# Patient Record
Sex: FEMALE | Race: WHITE | NOT HISPANIC OR LATINO | Employment: OTHER | URBAN - METROPOLITAN AREA
[De-identification: names, ages, dates, MRNs, and addresses within clinical notes are randomized per-mention and may not be internally consistent; named-entity substitution may affect disease eponyms.]

---

## 2022-09-22 ENCOUNTER — HOSPITAL ENCOUNTER (EMERGENCY)
Facility: MEDICAL CENTER | Age: 72
End: 2022-09-22
Attending: EMERGENCY MEDICINE
Payer: COMMERCIAL

## 2022-09-22 ENCOUNTER — APPOINTMENT (OUTPATIENT)
Dept: RADIOLOGY | Facility: MEDICAL CENTER | Age: 72
End: 2022-09-22
Attending: EMERGENCY MEDICINE
Payer: COMMERCIAL

## 2022-09-22 VITALS
OXYGEN SATURATION: 92 % | HEART RATE: 101 BPM | DIASTOLIC BLOOD PRESSURE: 83 MMHG | WEIGHT: 180 LBS | RESPIRATION RATE: 18 BRPM | BODY MASS INDEX: 29.99 KG/M2 | HEIGHT: 65 IN | TEMPERATURE: 97.9 F | SYSTOLIC BLOOD PRESSURE: 175 MMHG

## 2022-09-22 DIAGNOSIS — S01.01XA LACERATION OF SCALP, INITIAL ENCOUNTER: Primary | ICD-10-CM

## 2022-09-22 DIAGNOSIS — S09.90XA CLOSED HEAD INJURY, INITIAL ENCOUNTER: ICD-10-CM
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
## 2022-09-22 PROCEDURE — 305308 HCHG STAPLER,SKIN,DISP.

## 2022-09-22 PROCEDURE — 304999 HCHG REPAIR-SIMPLE/INTERMED LEVEL 1

## 2022-09-22 PROCEDURE — 304217 HCHG IRRIGATION SYSTEM

## 2022-09-22 PROCEDURE — 99284 EMERGENCY DEPT VISIT MOD MDM: CPT

## 2022-09-22 PROCEDURE — 70450 CT HEAD/BRAIN W/O DYE: CPT

## 2022-09-22 ASSESSMENT — PAIN SCALES - WONG BAKER
WONGBAKER_NUMERICALRESPONSE: DOESN'T HURT AT ALL
WONGBAKER_NUMERICALRESPONSE: DOESN'T HURT AT ALL

## 2022-09-23 NOTE — ED PROVIDER NOTES
ED Provider Note    Scribed for Alcon Van by Kush Van. 9/22/2022  9:54 PM    Primary care provider: None noted  Means of arrival: EMS  History obtained from: Patient  History limited by: None    CHIEF COMPLAINT  Chief Complaint   Patient presents with    T-5000 Head Injury     GLF @ [facility] tonight, + head strike, - LOC. 2cm lac posterior head. + ETOH.      HPI  Lucero Griffiths is a 72 y.o. female who presents to the Emergency Department via EMS for evaluation of a possible TBI following a fall onset tonight. She states that she was at the [facility] when she squatted to  her wallet, and when she went to get back up she fell and hit her head. Her fall was estimated at 2 feet, and she denies any loss of consciousness during the incident. She currently is complaining of tenderness and a laceration to the area where her fall occurred. She takes no blood thinners. She denies any headache, nausea or vomiting.    Quality: throbbing  Duration: 3 hours  Severity: mild  Associated sx: none    REVIEW OF SYSTEMS  As above, all other systems reviewed and are negative.   See HPI for further details.     PAST MEDICAL HISTORY     SURGICAL HISTORY  patient denies any surgical history  SOCIAL HISTORY  Social History     Tobacco Use    Smoking status: Never    Smokeless tobacco: Never   Vaping Use    Vaping Use: Never used   Substance Use Topics    Alcohol use: Yes     Alcohol/week: 0.6 oz     Types: 1 Glasses of wine per week     Comment: occasional    Drug use: Never      Social History     Substance and Sexual Activity   Drug Use None noted     FAMILY HISTORY  None noted  CURRENT MEDICATIONS  No current outpatient medications    ALLERGIES  None noted    PHYSICAL EXAM    VITAL SIGNS:   Vitals:    09/22/22 2208 09/22/22 2211 09/22/22 2300   BP:  (!) 196/107 (!) 175/83   Pulse:  (!) 105 (!) 101   Resp:  16 18   Temp:  36.7 °C (98.1 °F) 36.6 °C (97.9 °F)   TempSrc:  Oral Temporal   SpO2:  94% 92%  "  Weight: 81.6 kg (180 lb)     Height: 1.651 m (5' 5\")       Vitals: My interpretation: Hypertensive, borderline tachycardic, afebrile, not hypoxic    Reinterpretation of vitals: Improving    PE:   Constitutional: Well developed, Well nourished, No acute distress, Non-toxic appearance.   HENT: Normocephalic, there is a 2 cm laceration to the occiput that is not actively bleeding, is mildly tender, no C-spine, T-spine or L-spine tenderness step-off or deformity, Bilateral external ears normal, Oropharynx is clear mucous membranes are moist. No oral exudates or nasal discharge.   Eyes: Pupils are equal round and reactive, EOMI, Conjunctiva normal, No discharge.   Neck: Normal range of motion, No tenderness, Supple, No stridor. No meningismus.  Lymphatic: No lymphadenopathy noted.   Cardiovascular: Regular rate and rhythm without murmur rub or gallop.  Thorax & Lungs: Clear breath sounds bilaterally without wheezes, rhonchi or rales. There is no chest wall tenderness.   Abdomen: Soft non-tender non-distended. There is no rebound or guarding. No organomegaly is appreciated. Bowel sounds are normal.  Skin: Normal without rash.   Back: No CVA or spinal tenderness.   Extremities: Intact distal pulses, No edema, No tenderness, No cyanosis, No clubbing. Capillary refill is less than 2 seconds.  Musculoskeletal: Good range of motion in all major joints. No tenderness to palpation or major deformities noted.   Neurologic: Alert & oriented x 3, Normal motor function, Normal sensory function, No focal deficits noted. Reflexes are normal.  Psychiatric: Affect normal, Judgment normal, Mood normal. There is no suicidal ideation or patient reported hallucinations.     DIAGNOSTIC STUDIES / PROCEDURES    RADIOLOGY  CT-HEAD W/O   Final Result      1.  Cerebral atrophy.      2.  Otherwise, Head CT without contrast within normal limits. No evidence of acute cerebral infarction, hemorrhage or mass lesion.           The radiologist's " interpretation of all radiological studies have been reviewed by me.    Laceration Repair Procedure Note    Indication: Laceration    Procedure: The patient was placed in the appropriate position and anesthesia around the laceration was obtained by infiltration using none. The area was then irrigated with normal saline and the skin adjacent to the wound was cleansed with Betadine. The laceration was closed with staples. A total of 2 staples were placed. The wound area was then dressed with a sterile dressing.      Total repaired wound length: 1.5 cm.     COURSE & MEDICAL DECISION MAKING  Nursing notes, VS, PMSFHx, labs, imaging, EKG reviewed in chart.    MDM: 9:54 PM Lucero Griffiths is a 72 y.o. female who presented with mechanical fall while having a few alcoholic beverages at the [facility].  Patient dropped her wallet, bent over when she came back up fell backwards striking her head against the floor.  Arrives as a traumatic brain injury alert.  She is not on any anticoagulation.  No neck pain, no loss of consciousness.  She has a close to 2 cm laceration to the occiput of the scalp but no other injuries and is otherwise calm and well-appearing.  She is hypertensive and borderline tachycardic upon arrival but this improved without treatment in the ED.  She underwent CT imaging of the head due to her age and mechanism of trauma and CT was thankfully negative.  Wound was thoroughly irrigated and cleaned with Betadine and sterile saline and closed with 2 staples, see procedure note.  Patient has been were given strict return precautions and signs and symptoms of infection to look out for and to return.  They verbalized understanding return precautions.  She is already up-to-date on her tetanus.  She is ambulatory, well-appearing time of discharge and in no acute distress.    Patient has had high blood pressure while in the emergency department, felt likely secondary to medical condition.  Counseled patient to monitor blood pressure at home and follow up with primary care physician.      FINAL IMPRESSION  1. Laceration of scalp, initial encounter Acute   2. Closed head injury, initial encounter Acute   3. Fall, initial encounter Acute      Kush DEVI (Scribe), am scribing for, and in the presence of, Alcon Van.    Electronically signed by: Kush Van (Scribe), 9/22/2022    IAlcon personally performed the services described in this documentation, as scribed by Kush Van in my presence, and it is both accurate and complete.    The note accurately reflects work and decisions made by me.  Alcon Van  9/22/2022  11:15 PM

## 2022-09-23 NOTE — ED NOTES
"Pt discharged home with spouse. The pt is alert and oriented. The pt ambulates with a steady gait. Vitals stable on room air. IV discontinued and gauze placed, pt in possession of belongings. Pt provided discharge education and information pertaining to medications and follow up appointments. Pt received copy of discharge instructions and verbalized understanding. BP (!) 175/83   Pulse (!) 101   Temp 36.6 °C (97.9 °F) (Temporal)   Resp 18   Ht 1.651 m (5' 5\")   Wt 81.6 kg (180 lb)   SpO2 92%   BMI 29.95 kg/m²     "

## 2022-09-23 NOTE — DISCHARGE INSTRUCTIONS
Please wash the area with warm soapy water, very gently.  The staples will need to be removed in 10 to 14 days and you can come back here, seen urgent care or your PCP to have this done.  Monitor for signs of infection which include fever, pus, swelling or discharge or increased pain.  If they have any things happen, please return sooner to the ED.  Thank you for coming in today.

## 2022-09-23 NOTE — ED NOTES
The pt is alert and oriented. The pt denies pain. Lac noted on back of head. Vitals stable and pt hooked up to the monitor.